# Patient Record
Sex: FEMALE | Race: WHITE | URBAN - METROPOLITAN AREA
[De-identification: names, ages, dates, MRNs, and addresses within clinical notes are randomized per-mention and may not be internally consistent; named-entity substitution may affect disease eponyms.]

---

## 2022-05-31 NOTE — TELEPHONE ENCOUNTER
REFERRAL INFORMATION:    Referring Provider:  Self    Referring Clinic:      Reason for Visit/Diagnosis: new surg       FUTURE VISIT INFORMATION:    Appointment Date: 6.15.22    Appointment Time: 12:30 PM     NOTES RECORD STATUS  DETAILS   OFFICE NOTE from Referring Provider     OFFICE NOTE from Other Specialists     HOSPITAL DISCHARGE SUMMARY/ ED VISITS      OPERATIVE REPORT     ENDOSCOPY (EGD)      PERTINENT LABS Internal    PATHOLOGY REPORTS (RELATED)     IMAGING (CT, MRI, US, XR)  Internal 7.8.10 CT ab

## 2022-06-15 ENCOUNTER — PRE VISIT (OUTPATIENT)
Dept: ENDOCRINOLOGY | Facility: CLINIC | Age: 32
End: 2022-06-15